# Patient Record
Sex: FEMALE
[De-identification: names, ages, dates, MRNs, and addresses within clinical notes are randomized per-mention and may not be internally consistent; named-entity substitution may affect disease eponyms.]

---

## 2019-02-28 ENCOUNTER — HOSPITAL ENCOUNTER (OUTPATIENT)
Dept: HOSPITAL 80 - FIMAGING | Age: 44
End: 2019-02-28
Attending: INTERNAL MEDICINE
Payer: COMMERCIAL

## 2019-02-28 DIAGNOSIS — R07.9: Primary | ICD-10-CM

## 2019-02-28 DIAGNOSIS — J45.909: ICD-10-CM

## 2019-02-28 PROCEDURE — 78452 HT MUSCLE IMAGE SPECT MULT: CPT

## 2019-02-28 PROCEDURE — 93017 CV STRESS TEST TRACING ONLY: CPT

## 2019-02-28 PROCEDURE — A9500 TC99M SESTAMIBI: HCPCS

## 2019-02-28 PROCEDURE — C22G1ZZ TOMOGRAPHIC (TOMO) NUCLEAR MEDICINE IMAGING OF MYOCARDIUM USING TECHNETIUM 99M (TC-99M): ICD-10-PCS | Performed by: RADIOLOGY

## 2019-02-28 NOTE — PDCARST
CAR Stress Test Results


Type of Stress Test: Lexiscan stress test


Description of Procedure: After informed consent was obtained, pt was 

established to ECG, blood pressure, HR and oximetry monitoring.  STRESS EKG AND 

HEMODYNAMIC DATA.  Resting heart rate: 97  BPM.  Resting ECG: SR.  Resting 

blood pressure: 118/74  mmHg.  O2 saturation at rest: 99%.  Peak heart rate:  

133 BPM.  Peak blood pressure:   112/80   mmHg.  Arrhythmias: none.  Symptoms: 

The patient experienced no typical symptoms of angina during stress or 

recovery.  Stress/Infusion ECG: No change in rhythm with no significant ST/T 

wave changes.  Stress/infusion O2 saturation:


Impression: Uneventful Lexiscan infusion.


Conclusion: Await nuclear images.